# Patient Record
Sex: FEMALE
[De-identification: names, ages, dates, MRNs, and addresses within clinical notes are randomized per-mention and may not be internally consistent; named-entity substitution may affect disease eponyms.]

---

## 2021-08-06 PROBLEM — Z00.00 ENCOUNTER FOR PREVENTIVE HEALTH EXAMINATION: Status: ACTIVE | Noted: 2021-08-06

## 2021-09-27 ENCOUNTER — APPOINTMENT (OUTPATIENT)
Dept: ENDOCRINOLOGY | Facility: CLINIC | Age: 76
End: 2021-09-27
Payer: MEDICARE

## 2021-09-27 VITALS
BODY MASS INDEX: 32.25 KG/M2 | DIASTOLIC BLOOD PRESSURE: 70 MMHG | SYSTOLIC BLOOD PRESSURE: 130 MMHG | HEART RATE: 104 BPM | WEIGHT: 182 LBS | HEIGHT: 63 IN

## 2021-09-27 DIAGNOSIS — Z56.0 UNEMPLOYMENT, UNSPECIFIED: ICD-10-CM

## 2021-09-27 DIAGNOSIS — Z78.9 OTHER SPECIFIED HEALTH STATUS: ICD-10-CM

## 2021-09-27 DIAGNOSIS — Z63.5 DISRUPTION OF FAMILY BY SEPARATION AND DIVORCE: ICD-10-CM

## 2021-09-27 DIAGNOSIS — Z80.9 FAMILY HISTORY OF MALIGNANT NEOPLASM, UNSPECIFIED: ICD-10-CM

## 2021-09-27 LAB — GLUCOSE BLDC GLUCOMTR-MCNC: 105

## 2021-09-27 PROCEDURE — 99204 OFFICE O/P NEW MOD 45 MIN: CPT | Mod: 25

## 2021-09-27 PROCEDURE — 82962 GLUCOSE BLOOD TEST: CPT

## 2021-09-27 RX ORDER — METOPROLOL TARTRATE 25 MG/1
25 TABLET, FILM COATED ORAL
Qty: 60 | Refills: 0 | Status: DISCONTINUED | COMMUNITY
Start: 2021-08-23

## 2021-09-27 RX ORDER — AMLODIPINE BESYLATE 5 MG/1
5 TABLET ORAL
Qty: 90 | Refills: 0 | Status: ACTIVE | COMMUNITY
Start: 2021-09-09

## 2021-09-27 RX ORDER — METOPROLOL SUCCINATE 25 MG/1
25 TABLET, EXTENDED RELEASE ORAL
Qty: 90 | Refills: 0 | Status: ACTIVE | COMMUNITY
Start: 2021-09-09

## 2021-09-27 RX ORDER — ATORVASTATIN CALCIUM 40 MG/1
40 TABLET, FILM COATED ORAL DAILY
Qty: 90 | Refills: 0 | Status: ACTIVE | COMMUNITY

## 2021-09-27 RX ORDER — BLOOD-GLUCOSE METER
KIT MISCELLANEOUS
Qty: 1 | Refills: 0 | Status: ACTIVE | COMMUNITY
Start: 2021-08-25

## 2021-09-27 RX ORDER — ENALAPRIL MALEATE 10 MG/1
10 TABLET ORAL
Refills: 0 | Status: ACTIVE | COMMUNITY

## 2021-09-27 SDOH — SOCIAL STABILITY - SOCIAL INSECURITY: DISRUPTION OF FAMILY BY SEPARATION AND DIVORCE: Z63.5

## 2021-09-27 SDOH — ECONOMIC STABILITY - INCOME SECURITY: UNEMPLOYMENT, UNSPECIFIED: Z56.0

## 2021-09-27 NOTE — PHYSICAL EXAM
[Alert] : alert [Well Nourished] : well nourished [No Acute Distress] : no acute distress [Well Developed] : well developed [Normal Sclera/Conjunctiva] : normal sclera/conjunctiva [No Proptosis] : no proptosis [No LAD] : no lymphadenopathy [Thyroid Not Enlarged] : the thyroid was not enlarged [No Thyroid Nodules] : no palpable thyroid nodules [No Respiratory Distress] : no respiratory distress [No Accessory Muscle Use] : no accessory muscle use [Normal Rate and Effort] : normal respiratory rate and effort [Clear to Auscultation] : lungs were clear to auscultation bilaterally [Normal S1, S2] : normal S1 and S2 [Normal Rate] : heart rate was normal [Regular Rhythm] : with a regular rhythm [Normal Bowel Sounds] : normal bowel sounds [Not Tender] : non-tender [Soft] : abdomen soft [No Rash] : no rash [Left Foot Was Examined] : left foot ~C was examined [Normal] : normal [#1 Diminished] : number 1 was diminished [#2 Diminished] : number 2 was diminished [#3 Diminished] : number 3 was diminished [#4 Diminished] : number 4 was diminished [#5 Diminished] : number 5 was diminished [#6 Diminished] : number 6 was diminished [#7 Diminished] : number 7 was diminished [#8 Diminished] : number 8 was diminished [#9 Diminished] : number 9 was diminished [Acanthosis Nigricans] : no acanthosis nigricans [Swelling] : not swollen [Tenderness] : not tender [Erythema] : not erythematous [de-identified] : examined in wheelchair  [FreeTextEntry5] : 2 callus anterior and posterior of foot

## 2021-09-27 NOTE — REVIEW OF SYSTEMS
[Decreased Appetite] : decreased appetite [Recent Weight Loss (___ Lbs)] : recent weight loss: [unfilled] lbs [Fatigue] : no fatigue [Visual Field Defect] : no visual field defect [Blurred Vision] : no blurred vision [Dysphagia] : no dysphagia [Neck Pain] : no neck pain [Dysphonia] : no dysphonia [Chest Pain] : no chest pain [Palpitations] : no palpitations [Constipation] : no constipation [Diarrhea] : no diarrhea [Polyuria] : no polyuria [Dysuria] : no dysuria [Headaches] : no headaches [Tremors] : no tremors [Depression] : no depression [Anxiety] : no anxiety [Polydipsia] : no polydipsia [Swelling] : no swelling [de-identified] : dry mouth

## 2021-09-27 NOTE — ADDENDUM
[FreeTextEntry1] : Attending Physician Addendum:\par Patient was seen with Juana Roldan NP today.  \par Case was reviewed and plan discussed in detail.\par \par Briefly, 75 year old female with pre-existing Type 2 DM with recent presentation to hospital with severely uncontrolled DM related to noncompliance with medications s/p right BKA for osteomyelitis.  Control seems to be improving with metformin monotherapy.   Will continue therapy with metformin, otherwise plan as outlined above.  Will check updated labs and arrange for diabetes education.   Stressed the importance to patient of maintaining good glycemic control to avoid further DM complications.

## 2021-09-27 NOTE — ASSESSMENT
[Diabetes Foot Care] : diabetes foot care [Retinopathy Screening] : Patient was referred to ophthalmology for retinopathy screening [FreeTextEntry1] : 76 y/o female with Type 2 DM complicated by s/p right BKA and Hyperlipidemia along with Hypertension. Glycemic control has improved since hospitalization. \par \par Plan: \par Type 2 DM: check A1c now\par - continue Metformin \par - continue self blood sugar monitoring\par - call office with high/low blood sugars\par - educated on healthy food choices\par - schedule appointment in 2 weeks with CDE for diet education \par - stressed the importance of seeing podiatry due to foot callus \par - continue to follow up with ophthalmology\par \par Hyperlipidemia: check lipids now\par - continue statin \par \par Hypertension: BP acceptable, continue ACE-I and BB \par - continue to follow up with Cardiology \par \par RTO in 3 months\par \par Dr. Brody seen patient and agrees with plan.

## 2021-09-27 NOTE — HISTORY OF PRESENT ILLNESS
[FreeTextEntry1] : Patient was diagnosis over 10 years with Type 2 DM and at the time she was given Novolog. Pt. gradually took herself off of insulin and stopped following up PMD. In end of July she had a right foot ulcer and went to [facility]. During her hospitalization she was told she had a right foot infection that traveled to the bone. On 8/9/21 she had below the knee amputation of the right foot. Patient is seen in office today with daughter to establish care for diabetes.\par \par \par Quality: Type 2 DM\par Severity: uncontrolled \par Duration: over 10 years \par Onset: foot infection \par Modifying Factors: Better with medication \par Associated Symptoms:\par Family History: Mother - breast Cancer, Sister - colon cancer, Father - cirrhosis \par \par Current Regimen:\par Metformin 1000 mg BID \par \par Self blood sugar monitoring: 3 times a day\par before breakfast 104, 96, 98, 115, 115, 108\par before lunch 117, 96, 106, 80\par before dinner 98, 110, 139, 129, 99, 147\par current \par \par 7/29/21:  A1C 12.7%\par \par exercise: physical therapy 2 times a week \par \par Diet: 3 meals a day - small portion \par \par \par Date of last eye exam: 1 year ago (-)  \par 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

## 2021-10-20 ENCOUNTER — APPOINTMENT (OUTPATIENT)
Dept: ENDOCRINOLOGY | Facility: CLINIC | Age: 76
End: 2021-10-20

## 2022-01-02 ENCOUNTER — RX RENEWAL (OUTPATIENT)
Age: 77
End: 2022-01-02

## 2022-01-31 ENCOUNTER — NON-APPOINTMENT (OUTPATIENT)
Age: 77
End: 2022-01-31

## 2022-02-22 LAB
HBA1C MFR BLD HPLC: 6
LDLC SERPL DIRECT ASSAY-MCNC: 36
MICROALBUMIN/CREAT 24H UR-RTO: 8

## 2022-02-23 ENCOUNTER — APPOINTMENT (OUTPATIENT)
Dept: ENDOCRINOLOGY | Facility: CLINIC | Age: 77
End: 2022-02-23
Payer: MEDICARE

## 2022-02-23 VITALS
OXYGEN SATURATION: 99 % | WEIGHT: 168 LBS | DIASTOLIC BLOOD PRESSURE: 64 MMHG | HEART RATE: 81 BPM | BODY MASS INDEX: 29.77 KG/M2 | HEIGHT: 63 IN | SYSTOLIC BLOOD PRESSURE: 140 MMHG

## 2022-02-23 DIAGNOSIS — R74.8 ABNORMAL LEVELS OF OTHER SERUM ENZYMES: ICD-10-CM

## 2022-02-23 LAB — GLUCOSE BLDC GLUCOMTR-MCNC: 104

## 2022-02-23 PROCEDURE — 99214 OFFICE O/P EST MOD 30 MIN: CPT | Mod: 25

## 2022-02-23 PROCEDURE — 82962 GLUCOSE BLOOD TEST: CPT

## 2022-02-23 RX ORDER — METFORMIN HYDROCHLORIDE 500 MG/1
500 TABLET, COATED ORAL
Qty: 60 | Refills: 3 | Status: ACTIVE | COMMUNITY
Start: 1900-01-01 | End: 1900-01-01

## 2022-02-23 NOTE — PHYSICAL EXAM
[Alert] : alert [Well Nourished] : well nourished [No Acute Distress] : no acute distress [Well Developed] : well developed [Normal Sclera/Conjunctiva] : normal sclera/conjunctiva [No Proptosis] : no proptosis [No LAD] : no lymphadenopathy [Thyroid Not Enlarged] : the thyroid was not enlarged [No Thyroid Nodules] : no palpable thyroid nodules [No Respiratory Distress] : no respiratory distress [No Accessory Muscle Use] : no accessory muscle use [Normal Rate and Effort] : normal respiratory rate and effort [Clear to Auscultation] : lungs were clear to auscultation bilaterally [Normal S1, S2] : normal S1 and S2 [Normal Rate] : heart rate was normal [Regular Rhythm] : with a regular rhythm [No Edema] : no peripheral edema [Normal Bowel Sounds] : normal bowel sounds [Not Tender] : non-tender [Soft] : abdomen soft [No Rash] : no rash [Acanthosis Nigricans] : no acanthosis nigricans [Left foot was examined, including] : left foot ~C was examined, including visual inspection with sensory and pulse exams [Normal] : normal [#1 Diminished] : number 1 was diminished [#2 Diminished] : number 2 was diminished [#3 Diminished] : number 3 was diminished [#4 Diminished] : number 4 was diminished [#5 Diminished] : number 5 was diminished [#6 Diminished] : number 6 was diminished [#7 Diminished] : number 7 was diminished [#8 Diminished] : number 8 was diminished [#9 Diminished] : number 9 was diminished [No Tremors] : no tremors [Oriented x3] : oriented to person, place, and time [Normal Affect] : the affect was normal [Normal Insight/Judgement] : insight and judgment were intact [Normal Mood] : the mood was normal [de-identified] : ambulates with rolling walker  [de-identified] : l

## 2022-02-23 NOTE — ASSESSMENT
[FreeTextEntry1] : 75 y/o female with Type 2 DM complicated by s/p right BKA and Hyperlipidemia along with Hypertension. Glycemic control has improved since hospitalization. \par \par Plan: \par Type 2 DM: controlled \par - decrease Metformin to 500 mg BID to avoid hypoglycemia \par - continue self blood sugar monitoring\par - call office with high/low blood sugars\par - educated on healthy food choices\par - continue to follow up with ophthalmology and podiatry \par \par Hyperlipidemia: controlled \par - continue statin \par \par Hypertension: BP acceptable, continue ACE-I and BB \par - continue to follow up with Cardiology \par \par Elevated Alk Phos: repeat alk phos and check iso enzymes  \par \par RTO in 3-4 months with Dr. Brody

## 2022-02-23 NOTE — REASON FOR VISIT
[Follow - Up] : a follow-up visit [DM Type 2] : DM Type 2 [Other___] : [unfilled] [Family Member] : family member [Other: _____] : [unfilled]

## 2022-02-23 NOTE — REVIEW OF SYSTEMS
[Fatigue] : no fatigue [Decreased Appetite] : appetite not decreased [Recent Weight Gain (___ Lbs)] : no recent weight gain [Recent Weight Loss (___ Lbs)] : no recent weight loss [Visual Field Defect] : no visual field defect [Blurred Vision] : no blurred vision [Dysphagia] : no dysphagia [Neck Pain] : no neck pain [Dysphonia] : no dysphonia [Chest Pain] : no chest pain [Palpitations] : no palpitations [Constipation] : no constipation [Diarrhea] : no diarrhea [Polyuria] : no polyuria [Dysuria] : no dysuria [Headaches] : no headaches [Tremors] : no tremors [Depression] : no depression [Anxiety] : no anxiety [Polydipsia] : no polydipsia [FreeTextEntry2] : weight stable

## 2022-10-29 LAB
HBA1C MFR BLD HPLC: 6.8
LDLC SERPL DIRECT ASSAY-MCNC: 40
MICROALBUMIN/CREAT UR-RTO: 79

## 2022-10-31 ENCOUNTER — APPOINTMENT (OUTPATIENT)
Dept: ENDOCRINOLOGY | Facility: CLINIC | Age: 77
End: 2022-10-31

## 2022-10-31 VITALS
HEIGHT: 63 IN | DIASTOLIC BLOOD PRESSURE: 88 MMHG | OXYGEN SATURATION: 98 % | WEIGHT: 174 LBS | SYSTOLIC BLOOD PRESSURE: 142 MMHG | BODY MASS INDEX: 30.83 KG/M2 | HEART RATE: 89 BPM

## 2022-10-31 DIAGNOSIS — Z89.519 ACQUIRED ABSENCE OF UNSPECIFIED LEG BELOW KNEE: ICD-10-CM

## 2022-10-31 DIAGNOSIS — E11.9 TYPE 2 DIABETES MELLITUS W/OUT COMPLICATIONS: ICD-10-CM

## 2022-10-31 DIAGNOSIS — I10 ESSENTIAL (PRIMARY) HYPERTENSION: ICD-10-CM

## 2022-10-31 DIAGNOSIS — E78.5 HYPERLIPIDEMIA, UNSPECIFIED: ICD-10-CM

## 2022-10-31 LAB — GLUCOSE BLDC GLUCOMTR-MCNC: 122

## 2022-10-31 PROCEDURE — 82962 GLUCOSE BLOOD TEST: CPT

## 2022-10-31 PROCEDURE — 99214 OFFICE O/P EST MOD 30 MIN: CPT | Mod: 25

## 2022-10-31 RX ORDER — BLOOD SUGAR DIAGNOSTIC
STRIP MISCELLANEOUS
Qty: 100 | Refills: 1 | Status: ACTIVE | COMMUNITY
Start: 2021-08-25 | End: 1900-01-01

## 2022-10-31 RX ORDER — FOLIC ACID 1 MG/1
1 TABLET ORAL
Qty: 90 | Refills: 0 | Status: ACTIVE | COMMUNITY
Start: 2022-09-27

## 2022-10-31 RX ORDER — LANCETS 28 GAUGE
EACH MISCELLANEOUS
Qty: 100 | Refills: 1 | Status: ACTIVE | COMMUNITY
Start: 2021-08-25 | End: 1900-01-01

## 2022-10-31 RX ORDER — ASPIRIN 81 MG/1
81 TABLET ORAL DAILY
Qty: 90 | Refills: 0 | Status: DISCONTINUED | COMMUNITY
End: 2022-10-31

## 2022-10-31 NOTE — PHYSICAL EXAM
[Healthy Appearance] : healthy appearance [No Acute Distress] : no acute distress [Normal Sclera/Conjunctiva] : normal sclera/conjunctiva [No Proptosis] : no proptosis [No Neck Mass] : no neck mass was observed [No LAD] : no lymphadenopathy [Supple] : the neck was supple [Thyroid Not Enlarged] : the thyroid was not enlarged [No Thyroid Nodules] : no palpable thyroid nodules [No Respiratory Distress] : no respiratory distress [Clear to Auscultation] : lungs were clear to auscultation bilaterally [Normal S1, S2] : normal S1 and S2 [No Murmurs] : no murmurs [Normal Rate] : heart rate was normal [Regular Rhythm] : with a regular rhythm [Normal Affect] : the affect was normal [Normal Insight/Judgement] : insight and judgment were intact [Normal Mood] : the mood was normal [Acanthosis Nigricans] : no acanthosis nigricans [de-identified] : FANY METCALF

## 2022-10-31 NOTE — REVIEW OF SYSTEMS
[Recent Weight Gain (___ Lbs)] : no recent weight gain [Recent Weight Loss (___ Lbs)] : no recent weight loss [Chest Pain] : no chest pain [Shortness Of Breath] : no shortness of breath

## 2022-10-31 NOTE — ASSESSMENT
[FreeTextEntry1] : 74 y/o female with Type 2 DM complicated by albuminuria and right BKA with PMH of  Hyperlipidemia and HTN, here for follow up of DM.  Her diabetes is well controlled at this time.   \par \par 1. Type 2 DM: Continue current dose of Metformin.  Could consider changing Metformin to SGLT2-I in the future due to her mild nephropathy.   Repeat A1c and UACR in 4 months. \par \par 2.  Hyperlipidemia:  Continue statin\par \par 3.  Hypertension: Continue regimen including ACE-I.   Patient reports home BP readings are better than in office. \par \par 4.  Elevated LFTs-   could have NAFLD.  PCP has ordered RUQ US.  Recommended that she follow through with imaging for further evaluation.     \par \par  \par

## 2022-10-31 NOTE — HISTORY OF PRESENT ILLNESS
[FreeTextEntry1] : follow up of DM\par \par Quality:  Type 2 DM\par Severity: moderate\par Duration of diabetes: over 10 years\par Associated Complications/ Symptoms:  Albuminuria, Right BKA\par Modifying Factors:  Better with medication\par \par SMBGL  testing 3 times a day\par \par Current Diabetic Medication Regimen:\par Metformin 500 mg BID  (dose reduced last visit)\par

## 2023-02-27 ENCOUNTER — APPOINTMENT (OUTPATIENT)
Dept: ENDOCRINOLOGY | Facility: CLINIC | Age: 78
End: 2023-02-27

## 2023-04-03 ENCOUNTER — APPOINTMENT (OUTPATIENT)
Dept: ENDOCRINOLOGY | Facility: CLINIC | Age: 78
End: 2023-04-03

## 2024-08-07 NOTE — HISTORY OF PRESENT ILLNESS
[FreeTextEntry1] : Patient was diagnosis over 10 years with Type 2 DM and at the time she was given Novolog. Pt. gradually took herself off of insulin and stopped following up PMD. In end of July she had a right foot ulcer and went to [facility]. During her hospitalization she was told she had a right foot infection that traveled to the bone. On 8/9/21 she had below the knee amputation of the right foot. Patient is seen in office today with daughter to establish care for diabetes.\par \par Interval History:  Denies recent hospitalizations or illness \par \par \par Quality: Type 2 DM\par Severity: uncontrolled \par Duration: over 10 years \par Onset: foot infection \par Modifying Factors: Better with medication \par Associated Symptoms: RBKA\par Family History: Mother - breast Cancer, Sister - colon cancer, Father - cirrhosis \par \par Current Regimen:\par Metformin 1000 mg BID \par \par Self blood sugar monitoring: 3 times a day\par before breakfast 74-80s, sometimes 100s \par before lunch 80s-90s\par before dinner 80-90s \par current  - after cerreal and banana cup of tea\par \par 7/29/21:  A1C 12.7%, 1/21/22 A1C 6.0%\par \par exercise: physical therapy 2 times a week \par \par Diet: 2 meals a day - small portion sizes  \par \par \par Date of last eye exam: 1 year ago (-)  \par 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 
Stable